# Patient Record
Sex: FEMALE | Race: WHITE
[De-identification: names, ages, dates, MRNs, and addresses within clinical notes are randomized per-mention and may not be internally consistent; named-entity substitution may affect disease eponyms.]

---

## 2018-01-07 ENCOUNTER — HOSPITAL ENCOUNTER (EMERGENCY)
Dept: HOSPITAL 53 - M ED | Age: 58
Discharge: HOME | End: 2018-01-07
Payer: COMMERCIAL

## 2018-01-07 DIAGNOSIS — I48.91: ICD-10-CM

## 2018-01-07 DIAGNOSIS — E03.9: ICD-10-CM

## 2018-01-07 DIAGNOSIS — Z79.01: ICD-10-CM

## 2018-01-07 DIAGNOSIS — F43.20: Primary | ICD-10-CM

## 2018-01-07 DIAGNOSIS — Z91.19: ICD-10-CM

## 2018-01-07 DIAGNOSIS — Z79.899: ICD-10-CM

## 2018-01-07 PROCEDURE — 99284 EMERGENCY DEPT VISIT MOD MDM: CPT

## 2019-12-02 ENCOUNTER — HOSPITAL ENCOUNTER (OUTPATIENT)
Dept: HOSPITAL 53 - M SMT | Age: 59
End: 2019-12-02
Attending: NURSE PRACTITIONER
Payer: COMMERCIAL

## 2019-12-02 DIAGNOSIS — R39.15: Primary | ICD-10-CM

## 2019-12-02 LAB
APPEARANCE UR: (no result)
BACTERIA UR QL AUTO: NEGATIVE
BILIRUB UR QL STRIP.AUTO: NEGATIVE
GLUCOSE UR QL STRIP.AUTO: NEGATIVE MG/DL
HGB UR QL STRIP.AUTO: NEGATIVE
KETONES UR QL STRIP.AUTO: NEGATIVE MG/DL
LEUKOCYTE ESTERASE UR QL STRIP.AUTO: NEGATIVE
NITRITE UR QL STRIP.AUTO: NEGATIVE
PH UR STRIP.AUTO: 5 UNITS (ref 5–9)
PROT UR QL STRIP.AUTO: NEGATIVE MG/DL
RBC # UR AUTO: 0 /HPF (ref 0–3)
SP GR UR STRIP.AUTO: 1.03 (ref 1–1.03)
SQUAMOUS #/AREA URNS AUTO: 0 /HPF (ref 0–6)
UROBILINOGEN UR QL STRIP.AUTO: 0.2 MG/DL (ref 0–2)
WBC #/AREA URNS AUTO: 0 /HPF (ref 0–3)

## 2020-08-05 ENCOUNTER — HOSPITAL ENCOUNTER (OUTPATIENT)
Dept: HOSPITAL 53 - M SDC | Age: 60
Discharge: HOME | End: 2020-08-05
Attending: UROLOGY
Payer: COMMERCIAL

## 2020-08-05 VITALS — WEIGHT: 170 LBS | HEIGHT: 67 IN | BODY MASS INDEX: 26.68 KG/M2

## 2020-08-05 DIAGNOSIS — G47.30: ICD-10-CM

## 2020-08-05 DIAGNOSIS — Z79.01: ICD-10-CM

## 2020-08-05 DIAGNOSIS — Z95.0: ICD-10-CM

## 2020-08-05 DIAGNOSIS — N30.10: Primary | ICD-10-CM

## 2020-08-05 DIAGNOSIS — E03.9: ICD-10-CM

## 2020-08-05 DIAGNOSIS — Z86.718: ICD-10-CM

## 2020-08-05 DIAGNOSIS — I48.91: ICD-10-CM

## 2020-08-05 PROCEDURE — 52260 CYSTOSCOPY AND TREATMENT: CPT

## 2020-09-17 ENCOUNTER — HOSPITAL ENCOUNTER (OUTPATIENT)
Dept: HOSPITAL 53 - M LABSMTC | Age: 60
End: 2020-09-17
Attending: INTERNAL MEDICINE
Payer: COMMERCIAL

## 2020-09-17 DIAGNOSIS — Z20.828: Primary | ICD-10-CM

## 2020-10-08 NOTE — RO
DATE OF PROCEDURE: 08/05/2020



PREOPERATIVE DIAGNOSIS: Interstitial cystitis.



POSTOPERATIVE DIAGNOSIS: Interstitial cystitis.



PROCEDURE: Cystoscopy with hydrodistention and bladder instillation of 
Lidocaine.



SURGEON: Juan Antonio Haskins MD.



ANESTHESIA: General. 



ASSISTANT: None. 



INDICATION FOR OPERATION: This is a 59-year-old white female with severe 
symptoms of interstitial cystitis. She was, therefore, brought to the operating 
room for hydrodistention. 



DESCRIPTION OF OPERATION: The patient was anesthetized with general anesthesia 
after being placed on the table in the supine position. She was then placed in 
the lithotomy position, prepped with Betadine paint, draped in an aseptic 
manner, and time out was performed. 



A 22-[place] cystoscope was then inserted into the meatus and advanced under 
direct vision of a 30 degree lens to the bladder. The bladder was then drained 
and then filled under gravity pressure to 400 mL. At this point, the bladder 
filling stopped. The bladder was then drained. The water supply was raised 
another 12 inches, and the bladder was filled to 500 mL. This pressure was then 
held for 3 minutes. The bladder was then drained, and the patient was noted to 
have some petechial capillary bleeding. 50 mL of 2% Lidocaine was then instilled
into the bladder and left in place as the cystoscope was removed. The patient 
was then awakened and sent to recovery room in stable condition having tolerated
the procedure well. 



ESTIMATED BLOOD LOSS: Less than 5 mL.



SPECIMEN: None. 



DRAINS: None.



COMPLICATIONS: None. 

[facility]

## 2021-10-13 ENCOUNTER — HOSPITAL ENCOUNTER (OUTPATIENT)
Dept: HOSPITAL 53 - M SMT | Age: 61
End: 2021-10-13
Attending: SPECIALIST
Payer: COMMERCIAL

## 2021-10-13 DIAGNOSIS — R39.15: Primary | ICD-10-CM

## 2021-10-13 LAB
BACTERIA UR QL AUTO: NEGATIVE
MUCOUS THREADS URNS QL MICRO: (no result)
RBC # UR AUTO: 0 /HPF (ref 0–3)
SQUAMOUS #/AREA URNS AUTO: 1 /HPF (ref 0–6)
WBC #/AREA URNS AUTO: 4 /HPF (ref 0–3)